# Patient Record
Sex: FEMALE
[De-identification: names, ages, dates, MRNs, and addresses within clinical notes are randomized per-mention and may not be internally consistent; named-entity substitution may affect disease eponyms.]

---

## 2019-12-04 ENCOUNTER — HOSPITAL ENCOUNTER (EMERGENCY)
Dept: HOSPITAL 92 - ERS | Age: 39
LOS: 1 days | Discharge: HOME | End: 2019-12-05
Payer: COMMERCIAL

## 2019-12-04 DIAGNOSIS — F17.210: ICD-10-CM

## 2019-12-04 DIAGNOSIS — O24.911: ICD-10-CM

## 2019-12-04 DIAGNOSIS — O99.89: ICD-10-CM

## 2019-12-04 DIAGNOSIS — O99.331: ICD-10-CM

## 2019-12-04 DIAGNOSIS — O16.1: ICD-10-CM

## 2019-12-04 DIAGNOSIS — O23.41: Primary | ICD-10-CM

## 2019-12-04 DIAGNOSIS — R10.31: ICD-10-CM

## 2019-12-04 LAB
BACTERIA UR QL AUTO: (no result) HPF
HCG UR QL: POSITIVE
LEUKOCYTE ESTERASE UR QL STRIP.AUTO: 250 LEU/UL
PREGU CONTROL BACKGROUND?: (no result)
PREGU CONTROL BAR APPEAR?: YES
PROT UR STRIP.AUTO-MCNC: 30 MG/DL
RBC UR QL AUTO: (no result) HPF (ref 0–3)

## 2019-12-04 PROCEDURE — 81003 URINALYSIS AUTO W/O SCOPE: CPT

## 2019-12-04 PROCEDURE — 84702 CHORIONIC GONADOTROPIN TEST: CPT

## 2019-12-04 PROCEDURE — 93976 VASCULAR STUDY: CPT

## 2019-12-04 PROCEDURE — 81025 URINE PREGNANCY TEST: CPT

## 2019-12-04 PROCEDURE — 36415 COLL VENOUS BLD VENIPUNCTURE: CPT

## 2019-12-04 PROCEDURE — 76856 US EXAM PELVIC COMPLETE: CPT

## 2019-12-04 PROCEDURE — 81015 MICROSCOPIC EXAM OF URINE: CPT

## 2019-12-05 NOTE — ULT
PRELIMINARY REPORT/[facility]/EMERGENCY AFTER HOURS PROCEDURE



EXAM: 

US Pelvis, OB first trimester. 



CLINICAL HISTORY: 

Cramping pain (on/off) x 1 month, N/V, no vaginal bleeding HCG 76205 



TECHNIQUE: 

transabdominal pelvic ultrasound (complete) with image documentation. 



COMPARISON: 

None provided. 



FINDINGS: 

UTERUS/CERVIX:  The uterus is of normal echogenicity with no visualized uterine masses.  There is a s
jenny intrauterine pregnancy identified with normal appearing gestational sac, yolk sac and fetal

pole. 

Average ultrasound age 8 weeks 6 days. 

Crown rump length 2.43 cm corresponding with 9 weeks 1 day. 

Mean gestational sac diameter 3.35 cm.  Corresponding with estimated gestational age of 8 weeks 4 day
s. 

Fetal heart rate identified at 163 bpm. 



RIGHT OVARY: Not well seen.  No adnexal masses. 

LEFT OVARY: Normal follicles with 1.8 cm likely corpus luteum. No adnexal mass. Normal blood flow. 

FREE FLUID: No free fluid. 



IMPRESSION: 

Single, live intrauterine pregnancy measuring 8 weeks 6 days by ultrasound. 

No significant abnormalities.



ELECTRONICALLY SIGNED BY:

Benson Contreras M.D.

Dec 5, 2019 12:59:44 AM CST

This report is intended for review by the ordering physician only, in accordance of law. If you recei
ve this report in error, please call [facility] at [phone].



 

FINAL REPORT



EMERGENCY AFTER HOURS OB ULTRASOUND:

I agree with the preliminary report provided by [facility]. 



Transcribed Date/Time: 12/5/2019 8:50 AM



Reported By: Yakov Munson 

Electronically Signed:  12/5/2019 9:26 AM